# Patient Record
Sex: MALE | Race: WHITE | URBAN - METROPOLITAN AREA
[De-identification: names, ages, dates, MRNs, and addresses within clinical notes are randomized per-mention and may not be internally consistent; named-entity substitution may affect disease eponyms.]

---

## 2024-07-01 ENCOUNTER — TELEPHONE (OUTPATIENT)
Dept: FAMILY MEDICINE CLINIC | Age: 54
End: 2024-07-01

## 2024-07-01 ENCOUNTER — OFFICE VISIT (OUTPATIENT)
Dept: URGENT CARE | Age: 54
End: 2024-07-01

## 2024-07-01 VITALS
OXYGEN SATURATION: 97 % | HEART RATE: 100 BPM | TEMPERATURE: 97.9 F | HEIGHT: 64 IN | DIASTOLIC BLOOD PRESSURE: 78 MMHG | WEIGHT: 142 LBS | BODY MASS INDEX: 24.24 KG/M2 | SYSTOLIC BLOOD PRESSURE: 128 MMHG

## 2024-07-01 DIAGNOSIS — M10.9 ACUTE GOUT OF LEFT HAND, UNSPECIFIED CAUSE: Primary | ICD-10-CM

## 2024-07-01 DIAGNOSIS — Z75.8 DOES NOT HAVE PRIMARY CARE PROVIDER: ICD-10-CM

## 2024-07-01 RX ORDER — METHYLPREDNISOLONE 4 MG/1
TABLET ORAL
Qty: 1 KIT | Refills: 0 | Status: SHIPPED | OUTPATIENT
Start: 2024-07-01

## 2024-07-01 NOTE — PATIENT INSTRUCTIONS
Medrol steroid pack prescribed for the treatment of the suspected gout.  Take as directed.  Can use Tylenol for pain relief while on the steroids, once the steroid treatment has finished, can go back to using ibuprofen for pain relief.  Follow a Purine low diet (provided in the AVS packet) to help decrease you chances of another episode.  Follow up with the PCP referral to establish a PCP, and to discuss starting a medication called Allopurinol which can help prevent future episodes of gout.    New Prescriptions    No medications on file

## 2024-07-01 NOTE — PROGRESS NOTES
Luis Whitman (: 1970) is a 53 y.o. male, New patient, here for evaluation of the following chief complaint(s):  Other (Left thumb pain since )      ASSESSMENT/PLAN:    ICD-10-CM    1. Acute gout of left hand, unspecified cause  M10.9 methylPREDNISolone (MEDROL DOSEPACK) 4 MG tablet     Veronica Antonio CNP, [facility]      2. Does not have primary care provider  Z75.8 Veronica Antonio CNP, [facility]          Given history without any known injuries or traumas, and exam with erythematous inflammation to the distal joint of the left thumb and noted tenderness to palpation - with pain out of proportion with the applied touch, concern for possible gout flare vs other form of arthritis flare.  Low concerns for cellulitis, sprains, fractures, dislocations, osteomyelitis, osteitis, and septic joint.  Medrol steroid pack prescribed for treatment of the inflammation.  Provided resource discussing low purine diet.  Referral placed to establish a PCP due to pt not having a PCP  Discussed f/u with PCP to discuss starting allopurinol.   Discussed PCP follow up for persisting or worsening symptoms, or to return to the clinic if unable to obtain PCP follow up for worsening symptoms.    The patient tolerated their visit well. The patient and/or the family were informed of the results of any tests, a time was given to answer questions, a plan was proposed and they agreed with plan. Reviewed AVS with treatment instructions and answered questions - pt/family expresses understanding and agreement with the discussed treatment plan and AVS instructions.      SUBJECTIVE/OBJECTIVE:  HPI:   53 y.o. male presents for complaint of left thumb pain x yesterday.    Woke up with the pain, no pain 2 days ago, notes having had a relaxing day around the house watching TV, does not know of any injuries or traumas sustained that day. No history of gout or arthritis.    Notes pain

## 2024-07-01 NOTE — TELEPHONE ENCOUNTER
MISHA in regards to referral from urgent care to establish with primary care. Please schedule with Dr. Mao or Dr. Paris if he's interested in establishing care.

## 2024-08-13 ENCOUNTER — OFFICE VISIT (OUTPATIENT)
Dept: URGENT CARE | Age: 54
End: 2024-08-13

## 2024-08-13 VITALS
BODY MASS INDEX: 23.05 KG/M2 | HEIGHT: 64 IN | SYSTOLIC BLOOD PRESSURE: 134 MMHG | WEIGHT: 135 LBS | HEART RATE: 69 BPM | DIASTOLIC BLOOD PRESSURE: 83 MMHG | TEMPERATURE: 97.9 F | OXYGEN SATURATION: 97 %

## 2024-08-13 DIAGNOSIS — R11.0 NAUSEA: ICD-10-CM

## 2024-08-13 DIAGNOSIS — K52.9 GASTROENTERITIS: Primary | ICD-10-CM

## 2024-08-13 DIAGNOSIS — R03.0 ELEVATED BLOOD PRESSURE READING WITHOUT DIAGNOSIS OF HYPERTENSION: ICD-10-CM

## 2024-08-13 DIAGNOSIS — R19.7 DIARRHEA OF PRESUMED INFECTIOUS ORIGIN: ICD-10-CM

## 2024-08-13 DIAGNOSIS — Z75.8 DOES NOT HAVE PRIMARY CARE PROVIDER: ICD-10-CM

## 2024-08-13 RX ORDER — PROMETHAZINE HYDROCHLORIDE 25 MG/1
25 TABLET ORAL 4 TIMES DAILY PRN
Qty: 20 TABLET | Refills: 0 | Status: SHIPPED | OUTPATIENT
Start: 2024-08-13 | End: 2024-08-20

## 2024-08-13 ASSESSMENT — VISUAL ACUITY: OU: 1

## 2024-08-13 NOTE — PROGRESS NOTES
Luis Whitman (: 1970) is a 53 y.o. male, Established patient, here for evaluation of the following chief complaint(s):  Nausea and Diarrhea (X 2 days has had nausea and diarrhea. Clammy, cold hands)      ASSESSMENT/PLAN:    ICD-10-CM    1. Gastroenteritis  K52.9 promethazine (PHENERGAN) 25 MG tablet      2. Nausea  R11.0 promethazine (PHENERGAN) 25 MG tablet      3. Diarrhea of presumed infectious origin  R19.7       4. Elevated blood pressure reading without diagnosis of hypertension  R03.0 [facility]      5. Does not have primary care provider  Z75.8 [facility]          - Gastroenteritis:  Given history, symptoms and exam, concern for acute gastroenteritis.  Low concern for cholecystitis, pancreatitis, hepatitis, appendicitis, GI obstruction, impaction/obstruction, mesenteric ischemia, and diverticulitis  Phenergan prescribed for relief of nausea.  Pt notes not liking the taste of the ODT Zofran that he has had in the past.  Discussed pushing fluids, especially electrolyte rich fluids.  [place]/BRAT diet discussed, with a slow progression back to normal diet.  Hot compresses/heating pads over the stomach to help with cramping.  Discussed return and ED follow up recommendations    Discussed PCP follow up for persisting or worsening symptoms, or to return to the clinic if unable to obtain PCP follow up for worsening symptoms.      - Elevated Blood Pressure Readings without Diagnosis of Hypertension:  Pt's BP was noted to be elevated during initial vital signs assessment - repeated BP assessment, >5 minutes after the initial measurement, shows persistent BP elevation.  Given persistently elevated readings, concern for potential undiagnosed hypertension, which, if left unassessed and untreated, could lead to other life altering vascular, cardiac, kidney, or other serious conditions  Low concerns for hypertensive crisis or end organ damage at this

## 2024-08-13 NOTE — PATIENT INSTRUCTIONS
Phenergan (promethazine) prescribed for relief of nausea.  Increase intake of sport drinks or other electrolyte rich drinks, such as Gatorade, Pedialyte, or 50:50 apple juice diluted with water to help replenish lost fluids and electrolytes.  [place]/BRAT (Bananas, Rice, Applesauce, Toast) diet during this time with a slow progression back to normal diet.  Do not recommend constant use of Imodium or Pepto-bismol to stop the diarrhea as it may prolong the illness or cause rebound constipation - recommend continuing with increased fluids and bland diet.  Hot compresses/heating pads over the stomach to help with cramping.  If the abdominal pain becomes severe, localizes to the lower right side of the abdomen, dark tar-like stools develop,  unresolving vomiting, if blood is noted in stool or vomit, or if you become concerned for dehydration follow up with the emergency room.  Follow up with your PCP within 7 days or, if unable, you can return to the clinic if symptoms persist beyond 7 days or if symptoms worsen.      Continue at-home monitoring of BP - recommend keeping a log of your blood pressures to discuss with your PCP.   Follow up with PCP to further evaluate your elevated blood pressures noted in clinic - referral provided.  Recommend DASH diet and increased exercise, as discussed.  If your blood pressure's top number reads over 180, or the bottom number reads over 100, or if you develop headaches, vision changes, chest pain, shortness of breath, back pain, abdominal pain, weakness, numbness, dizziness, lightheadedness, or any other concerns develop, follow up with the ER for further evaluation.    New Prescriptions    PROMETHAZINE (PHENERGAN) 25 MG TABLET    Take 1 tablet by mouth 4 times daily as needed for Nausea

## 2025-07-23 ENCOUNTER — OFFICE VISIT (OUTPATIENT)
Dept: URGENT CARE | Age: 55
End: 2025-07-23

## 2025-07-23 VITALS
HEART RATE: 69 BPM | OXYGEN SATURATION: 97 % | SYSTOLIC BLOOD PRESSURE: 118 MMHG | TEMPERATURE: 98 F | WEIGHT: 135.6 LBS | BODY MASS INDEX: 23.28 KG/M2 | DIASTOLIC BLOOD PRESSURE: 74 MMHG

## 2025-07-23 DIAGNOSIS — M25.551 RIGHT HIP PAIN: ICD-10-CM

## 2025-07-23 DIAGNOSIS — S76.011A STRAIN OF MUSCLE OF RIGHT HIP, INITIAL ENCOUNTER: Primary | ICD-10-CM

## 2025-07-23 RX ORDER — IBUPROFEN 600 MG/1
600 TABLET, FILM COATED ORAL 4 TIMES DAILY PRN
Qty: 40 TABLET | Refills: 0 | Status: SHIPPED | OUTPATIENT
Start: 2025-07-23

## 2025-07-23 ASSESSMENT — VISUAL ACUITY: OU: 1

## 2025-07-23 NOTE — PATIENT INSTRUCTIONS
Luis,    Thank you for trusting [facility] with your care. Your decision to come to us means a lot and we are honored to be part of your healthcare journey. We value your trust and hope your experience with us was positive and met your expectations.    We're always looking for ways to improve, and your feedback is incredibly important to us. You will receive a text or email soon asking you how your visit went. for If you could take a moment to share your thoughts, it would mean the world to us. Your input helps us better serve you and others in the community.     Thank you again for choosing us. We're grateful for the opportunity to care for you and your loved ones. We hope to see you again - though we always wish you health and wellness!    Warm regards,    [facility]    Srinivasa Cuevas (Physician Assistant), Malou (Clinical Supervisor + Registration), and Estrellita (Medical Assistant)        Exam is concerning for a strain of the muscle of the right hip.  ibuprofen, 600 mg prescribed for treatment of pain and inflammation.   You may also take 500 mg of acetaminophen (1 tablet of Extra Strength Tylenol), 3 hours after taking the prescribed ibuprofen for further pain relief. Do not take more than 4000 mg of acetaminophen from all sources in a 24-hour period.  Rest and elevate the affected painful area.    Apply warm or cold compresses intermittently as needed.  Apply the compresses for 15-20 minutes, with 30-60 minutes between applications.  As pain recedes, begin normal activities slowly as tolerated.  Recommend orthopedic follow up for any persistent or worsening symptoms.     New Prescriptions    IBUPROFEN (ADVIL;MOTRIN) 600 MG TABLET    Take 1 tablet by mouth 4 times daily as needed for Pain

## 2025-07-23 NOTE — PROGRESS NOTES
Luis Whitman (: 1970) is a 54 y.o. male, Established patient, here for evaluation of the following chief complaint(s):  Hip Pain (Right hip pain, felt something pull a couple weeks ago in [place], it eased up but then re aggravated it the other day, feels like the pain is on the outer part of the thigh, laying still makes it better  )      ASSESSMENT/PLAN:    ICD-10-CM    1. Strain of muscle of right hip, initial encounter  S76.011A ibuprofen (ADVIL;MOTRIN) 600 MG tablet      2. Right hip pain  M25.551           - Strain of the Right Hip:  Given previous mechanism of injury within the last few weeks, with re-exacerbation of the pain last night, and exam with tenderness along the right lateral thigh into the hip, limited to the superficial musculature, there is concern for a strain of the muscle of the right hip/thigh.   Low concern for fractures, dislocations, compartment syndrome, hematomas, blood clots, lymphedema, cellulitis, osteomyelitis, abrasions, lacerations, or neurovascular compromise.  Patient does have a previous history of gout, however, as the tenderness is less in the hip joint as it is of the overlying musculature, as well as the pain extending distally along the muscle, there is higher concern for a strain of the muscle as opposed to a gout flare.  ibuprofen, 600 mg prescribed for treatment of pain and inflammation.  RICE therapy, as discussed  Recommend orthopedic follow up for any persistent or worsening symptoms.    Discussed PCP follow up for persisting or worsening symptoms, or to return to the clinic if unable to obtain PCP follow up for worsening symptoms.    The patient tolerated their visit well. A time was given to answer questions and a plan was established, proposed, and was agreed upon. Reviewed AVS with treatment instructions and answered questions - patient acknowledges understanding and agreement with the discussed treatment plan and AVS  How Severe Is Your Skin Lesion?: moderate Have Your Skin Lesions Been Treated?: not been treated Is This A New Presentation, Or A Follow-Up?: Skin Lesions